# Patient Record
Sex: FEMALE | Race: WHITE
[De-identification: names, ages, dates, MRNs, and addresses within clinical notes are randomized per-mention and may not be internally consistent; named-entity substitution may affect disease eponyms.]

---

## 2019-12-30 ENCOUNTER — HOSPITAL ENCOUNTER (EMERGENCY)
Dept: HOSPITAL 47 - EC | Age: 54
Discharge: HOME | End: 2019-12-30
Payer: COMMERCIAL

## 2019-12-30 VITALS
HEART RATE: 85 BPM | SYSTOLIC BLOOD PRESSURE: 104 MMHG | RESPIRATION RATE: 16 BRPM | DIASTOLIC BLOOD PRESSURE: 84 MMHG | TEMPERATURE: 97.8 F

## 2019-12-30 DIAGNOSIS — K80.20: Primary | ICD-10-CM

## 2019-12-30 LAB
ALBUMIN SERPL-MCNC: 4.6 G/DL (ref 3.5–5)
ALP SERPL-CCNC: 117 U/L (ref 38–126)
ALT SERPL-CCNC: 26 U/L (ref 4–34)
ANION GAP SERPL CALC-SCNC: 7 MMOL/L
APTT BLD: 22.3 SEC (ref 22–30)
AST SERPL-CCNC: 47 U/L (ref 14–36)
BASOPHILS # BLD AUTO: 0 K/UL (ref 0–0.2)
BASOPHILS NFR BLD AUTO: 0 %
BUN SERPL-SCNC: 16 MG/DL (ref 7–17)
CALCIUM SPEC-MCNC: 9.9 MG/DL (ref 8.4–10.2)
CHLORIDE SERPL-SCNC: 106 MMOL/L (ref 98–107)
CO2 SERPL-SCNC: 27 MMOL/L (ref 22–30)
EOSINOPHIL # BLD AUTO: 0.2 K/UL (ref 0–0.7)
EOSINOPHIL NFR BLD AUTO: 4 %
ERYTHROCYTE [DISTWIDTH] IN BLOOD BY AUTOMATED COUNT: 4.75 M/UL (ref 3.8–5.4)
ERYTHROCYTE [DISTWIDTH] IN BLOOD: 13 % (ref 11.5–15.5)
GLUCOSE SERPL-MCNC: 122 MG/DL (ref 74–99)
HCT VFR BLD AUTO: 43.3 % (ref 34–46)
HGB BLD-MCNC: 14.5 GM/DL (ref 11.4–16)
INR PPP: 0.9 (ref ?–1.2)
LYMPHOCYTES # SPEC AUTO: 2 K/UL (ref 1–4.8)
LYMPHOCYTES NFR SPEC AUTO: 35 %
MAGNESIUM SPEC-SCNC: 2.2 MG/DL (ref 1.6–2.3)
MCH RBC QN AUTO: 30.7 PG (ref 25–35)
MCHC RBC AUTO-ENTMCNC: 33.6 G/DL (ref 31–37)
MCV RBC AUTO: 91.2 FL (ref 80–100)
MONOCYTES # BLD AUTO: 0.4 K/UL (ref 0–1)
MONOCYTES NFR BLD AUTO: 6 %
NEUTROPHILS # BLD AUTO: 3 K/UL (ref 1.3–7.7)
NEUTROPHILS NFR BLD AUTO: 53 %
PLATELET # BLD AUTO: 250 K/UL (ref 150–450)
POTASSIUM SERPL-SCNC: 3.6 MMOL/L (ref 3.5–5.1)
PROT SERPL-MCNC: 7.9 G/DL (ref 6.3–8.2)
PT BLD: 9.6 SEC (ref 9–12)
SODIUM SERPL-SCNC: 140 MMOL/L (ref 137–145)
WBC # BLD AUTO: 5.7 K/UL (ref 3.8–10.6)

## 2019-12-30 PROCEDURE — 85730 THROMBOPLASTIN TIME PARTIAL: CPT

## 2019-12-30 PROCEDURE — 96374 THER/PROPH/DIAG INJ IV PUSH: CPT

## 2019-12-30 PROCEDURE — 93005 ELECTROCARDIOGRAM TRACING: CPT

## 2019-12-30 PROCEDURE — 36415 COLL VENOUS BLD VENIPUNCTURE: CPT

## 2019-12-30 PROCEDURE — 99285 EMERGENCY DEPT VISIT HI MDM: CPT

## 2019-12-30 PROCEDURE — 83735 ASSAY OF MAGNESIUM: CPT

## 2019-12-30 PROCEDURE — 74174 CTA ABD&PLVS W/CONTRAST: CPT

## 2019-12-30 PROCEDURE — 84484 ASSAY OF TROPONIN QUANT: CPT

## 2019-12-30 PROCEDURE — 71275 CT ANGIOGRAPHY CHEST: CPT

## 2019-12-30 PROCEDURE — 85025 COMPLETE CBC W/AUTO DIFF WBC: CPT

## 2019-12-30 PROCEDURE — 76705 ECHO EXAM OF ABDOMEN: CPT

## 2019-12-30 PROCEDURE — 83690 ASSAY OF LIPASE: CPT

## 2019-12-30 PROCEDURE — 85610 PROTHROMBIN TIME: CPT

## 2019-12-30 PROCEDURE — 80053 COMPREHEN METABOLIC PANEL: CPT

## 2019-12-30 PROCEDURE — 71046 X-RAY EXAM CHEST 2 VIEWS: CPT

## 2019-12-30 NOTE — US
EXAMINATION TYPE: US gallbladder

 

DATE OF EXAM: 12/30/2019

 

COMPARISON: NONE

 

CLINICAL HISTORY: epigastric pain, gall stone. Possible gallstones visualized on recent CT

 

EXAM MEASUREMENTS:

 

Liver Length:  13.1 cm   

Gallbladder Wall:  0.3 cm   

CBD:  0.5 cm

Right Kidney:  10.0 x 5.2 x 5.0 cm

 

 

 

Pancreas:  Tail obscured by overlying bowel gas

Liver:  appears wnl  

Gallbladder:  multiple stones noted

**Evidence for sonographic Delatorre's sign:  no

CBD:  wnl 

Right Kidney:  no evidence of hydronephrosis or mass  

 

 

 

IMPRESSION: Cholelithiasis without sonographic evidence of acute cholecystitis.

## 2019-12-30 NOTE — ED
Chest Pain HPI





- General


Chief Complaint: Chest Pain


Stated Complaint: Chest Pain


Time Seen by Provider: 19 03:35


Source: patient


Mode of arrival: ambulatory


Limitations: no limitations





- History of Present Illness


Initial Comments: 


Bettie is a 54-year-old female who does not follow with a primary care physician

who presents the ER today for evaluation of chest pain that radiates to her 

back.  Patient reports that for the past couple of days she's been having 

intermittent chest pain and palpitations, she woke tonight with chest pain 

radiating to her back.  Patient has no known history of hypertension, 

hyperlipidemia or diabetes.  She states that about 8 years ago she was worked up

for chest pain in the hospital and was discharged home.  Father  of a MI at 

the age of 88.








- Related Data


                                    Allergies











Allergy/AdvReac Type Severity Reaction Status Date / Time


 


No Known Allergies Allergy   Verified 19 03:02














Review of Systems


ROS Statement: 


Those systems with pertinent positive or pertinent negative responses have been 

documented in the HPI.





ROS Other: All systems not noted in ROS Statement are negative.





EKG Findings





- EKG Comments:


EKG Findings:: EKG was obtained due to complaint of chest pain, EKG was obtained

at 3:10 AM, rate is 82 over the sinuses normal axis, normal intervals, , 

QRS 84, QTC is 450 there are no acute ST elevations, mild ST depressions 

laterally no evidence of acute infarction.





Past Medical History


Past Medical History: No Reported History


History of Any Multi-Drug Resistant Organisms: None Reported


Additional Past Surgical History / Comment(s): neck surgery,


Past Psychological History: No Psychological Hx Reported


Smoking Status: Never smoker


Past Alcohol Use History: Occasional


Past Drug Use History: None Reported





General Exam





- General Exam Comments


Initial Comments: 


Physical Exam


GENERAL:


Patient is well-developed and well-nourished.  Patient is nontoxic and well-

hydrated and is in no distress.





HENT:


Normocephalic, Atraumatic. 





EYES:


PERRL, EOMI





PULMONARY:


Unlabored respirations.  No audible rales rhonchi or wheezing was noted.





CARDIOVASCULAR:


There is a regular rate and rhythm without any murmurs gallops or rubs.  





ABDOMEN:


Soft and nontender with normal bowel sounds. 





SKIN:


Skin is clear with no lesions or rashes and otherwise unremarkable.





: 


Deferred





NEUROLOGIC:


Patient is alert and oriented x3.  Moving all extremities spontaneously





MUSCULOSKELETAL:


Normal extremities with adequate strength and full range of motion.  No lower 

extremity swelling or edema.  No calf tenderness.  





PSYCHIATRIC:


Normal psychiatric evaluation.





Limitations: no limitations





Course


                                   Vital Signs











  19





  03:00 04:49


 


Temperature 97.6 F 


 


Pulse Rate 93 80


 


Respiratory 18 18





Rate  


 


Blood Pressure 128/84 115/85


 


O2 Sat by Pulse 100 98





Oximetry  














Chest Pain MDM





- MDM


She was seen and evaluated history was obtained from patient


Pleasant 54-year-old female with no cardiac risk factors his presenting with 

what she describes as pain radiating from her chest to her back.  Unlike 

anything she has periods before.  EKG was nonischemic





Labs were unremarkable 


Patient reported complete resolution of pain after single dose of morphine


CT angiography was obtained and revealed normal vasculature, no PE, evidence of 

gallstone.  





 ultrasound was ordered to evaluate the gallbladder and was obtained does reveal

gallstones without signs of acute Noland lithiasis 





Patient made asymptomatic throughout the remainder of her ER stay.  Results were

discussed the patient is comfortable with plan for discharge home and outpatient

follow-up.  Dietary modifications for symptomatic cholelithiasis were discussed.

 Referral to surgery was provided upon discharge.  All questions pertaining care

were answered return parameters discussed patient discharged home in stable 

condition.  





Disposition


Clinical Impression: 


 Cholelithiases





Disposition: HOME SELF-CARE


Condition: Stable


Instructions (If sedation given, give patient instructions):  Gallstones (ED)


Is patient prescribed a controlled substance at d/c from ED?: No


Referrals: 


Branden Dave DO [Primary Care Provider] - 1-2 days


Jesus Jain MD [Medical Doctor] - 1-2 days


Huyen Andrade MD [STAFF PHYSICIAN] - 1-2 days

## 2019-12-30 NOTE — XR
EXAMINATION TYPE: XR chest 2V

 

DATE OF EXAM: 12/30/2019

 

COMPARISON: NONE

 

HISTORY: Chest pain

 

TECHNIQUE: 2 views

 

FINDINGS: Heart and mediastinum are normal. Lungs are clear. Diaphragm is normal. There are chest myranda
ds. There is cervical spine fusion surgery.

 

IMPRESSION: Normal chest

## 2019-12-30 NOTE — CT
EXAMINATION TYPE: CT angio thor/abd pel aorta

 

DATE OF EXAM: 12/30/2019

 

COMPARISON: None

 

HISTORY: Chest pain

 

CT DLP: 1177 mGycm

Automated exposure control for dose reduction was used.

 

CONTRAST: 

Performed with IV Contrast, patient injected with 100 mL of Isovue 370.

 

Images were obtained from the thoracic inlet to the floor the pelvis without and with IV contrast. Th
ere are 3-D post processed images.

 

The lungs are clear of consolidation. There is no pleural effusion or pneumothorax. Thoracic aorta is
 intact. There is no aneurysm or dissection. There is normal contrast opacification of the pulmonary 
arteries. I see no filling defects.

 

Liver and spleen appear normal. Stomach is intact. There is no evidence of pancreatic mass. There is 
increased density in the dependent gallbladder that could be a large gallstone that measures 2.5 cm. 
Gallbladder is distended. There is no adrenal mass. Kidneys show satisfactory contrast opacification.
 There is no hydronephrosis. There is no retroperitoneal adenopathy. Appendix appears normal.

 

There is normal contrast opacification of the iliac and femoral arteries. The abdominal aorta shows n
o aneurysm or dissection. There is patency of the renal arteries, celiac artery and superior mesenter
ic artery. There is no evidence of abdominal aortic aneurysm or dissection. There is no evidence of h
emodynamic stenosis.

 

Thoracic and lumbar spine are intact. Bony pelvis is intact.

 

IMPRESSION:

No evidence of pulmonary embolism. No evidence of aortic aneurysm or dissection. No evidence of hemod
ynamic stenosis. Possible gallstone.